# Patient Record
Sex: MALE | Race: WHITE | NOT HISPANIC OR LATINO | Employment: OTHER | URBAN - NONMETROPOLITAN AREA
[De-identification: names, ages, dates, MRNs, and addresses within clinical notes are randomized per-mention and may not be internally consistent; named-entity substitution may affect disease eponyms.]

---

## 2017-01-13 ENCOUNTER — HOSPITAL ENCOUNTER (OUTPATIENT)
Dept: ULTRASOUND IMAGING | Facility: HOSPITAL | Age: 52
Discharge: HOME | End: 2017-01-13
Admitting: INTERNAL MEDICINE

## 2017-01-13 ENCOUNTER — APPOINTMENT (OUTPATIENT)
Dept: LAB | Facility: HOSPITAL | Age: 52
End: 2017-01-13

## 2017-01-13 ENCOUNTER — OFFICE VISIT (OUTPATIENT)
Dept: GASTROENTEROLOGY | Facility: CLINIC | Age: 52
End: 2017-01-13

## 2017-01-13 VITALS
HEART RATE: 64 BPM | OXYGEN SATURATION: 98 % | SYSTOLIC BLOOD PRESSURE: 119 MMHG | WEIGHT: 136 LBS | HEIGHT: 65 IN | DIASTOLIC BLOOD PRESSURE: 72 MMHG | BODY MASS INDEX: 22.66 KG/M2

## 2017-01-13 DIAGNOSIS — K62.5 HEMORRHAGE OF ANUS AND RECTUM: ICD-10-CM

## 2017-01-13 DIAGNOSIS — B18.2 CHRONIC HEPATITIS C WITHOUT HEPATIC COMA (HCC): Primary | ICD-10-CM

## 2017-01-13 DIAGNOSIS — K59.00 CONSTIPATION, UNSPECIFIED CONSTIPATION TYPE: ICD-10-CM

## 2017-01-13 DIAGNOSIS — R19.4 CHANGE IN BOWEL HABITS: ICD-10-CM

## 2017-01-13 DIAGNOSIS — K21.9 GASTROESOPHAGEAL REFLUX DISEASE, ESOPHAGITIS PRESENCE NOT SPECIFIED: ICD-10-CM

## 2017-01-13 DIAGNOSIS — F10.10 ALCOHOL ABUSE: ICD-10-CM

## 2017-01-13 DIAGNOSIS — R12 HEARTBURN: ICD-10-CM

## 2017-01-13 DIAGNOSIS — R10.32 LEFT LOWER QUADRANT PAIN: ICD-10-CM

## 2017-01-13 LAB
FERRITIN SERPL-MCNC: 134 NG/ML (ref 21.9–321.7)
HBV SURFACE AB SER RIA-ACNC: NORMAL
HBV SURFACE AG SERPL QL IA: NORMAL
IRON 24H UR-MRATE: 76 MCG/DL (ref 53–167)
IRON SATN MFR SERPL: 23 % (ref 20–50)
TIBC SERPL-MCNC: 329 MCG/DL (ref 241–421)

## 2017-01-13 PROCEDURE — 36415 COLL VENOUS BLD VENIPUNCTURE: CPT | Performed by: INTERNAL MEDICINE

## 2017-01-13 PROCEDURE — 87340 HEPATITIS B SURFACE AG IA: CPT | Performed by: INTERNAL MEDICINE

## 2017-01-13 PROCEDURE — 83516 IMMUNOASSAY NONANTIBODY: CPT | Performed by: INTERNAL MEDICINE

## 2017-01-13 PROCEDURE — 87522 HEPATITIS C REVRS TRNSCRPJ: CPT | Performed by: INTERNAL MEDICINE

## 2017-01-13 PROCEDURE — 82103 ALPHA-1-ANTITRYPSIN TOTAL: CPT | Performed by: INTERNAL MEDICINE

## 2017-01-13 PROCEDURE — 99244 OFF/OP CNSLTJ NEW/EST MOD 40: CPT | Performed by: INTERNAL MEDICINE

## 2017-01-13 PROCEDURE — 86038 ANTINUCLEAR ANTIBODIES: CPT | Performed by: INTERNAL MEDICINE

## 2017-01-13 PROCEDURE — 76705 ECHO EXAM OF ABDOMEN: CPT | Performed by: RADIOLOGY

## 2017-01-13 PROCEDURE — 83540 ASSAY OF IRON: CPT | Performed by: INTERNAL MEDICINE

## 2017-01-13 PROCEDURE — 82728 ASSAY OF FERRITIN: CPT | Performed by: INTERNAL MEDICINE

## 2017-01-13 PROCEDURE — 87350 HEPATITIS BE AG IA: CPT | Performed by: INTERNAL MEDICINE

## 2017-01-13 PROCEDURE — 83550 IRON BINDING TEST: CPT | Performed by: INTERNAL MEDICINE

## 2017-01-13 PROCEDURE — 87902 NFCT AGT GNTYP ALYS HEP C: CPT | Performed by: INTERNAL MEDICINE

## 2017-01-13 PROCEDURE — 76705 ECHO EXAM OF ABDOMEN: CPT

## 2017-01-13 PROCEDURE — 86708 HEPATITIS A ANTIBODY: CPT | Performed by: INTERNAL MEDICINE

## 2017-01-13 PROCEDURE — 82104 ALPHA-1-ANTITRYPSIN PHENO: CPT | Performed by: INTERNAL MEDICINE

## 2017-01-13 PROCEDURE — 86706 HEP B SURFACE ANTIBODY: CPT | Performed by: INTERNAL MEDICINE

## 2017-01-13 PROCEDURE — 82390 ASSAY OF CERULOPLASMIN: CPT | Performed by: INTERNAL MEDICINE

## 2017-01-13 RX ORDER — POLYETHYLENE GLYCOL 3350 17 G/17G
POWDER, FOR SOLUTION ORAL
Qty: 510 G | Refills: 0 | Status: SHIPPED | OUTPATIENT
Start: 2017-01-13

## 2017-01-13 RX ORDER — ONDANSETRON 4 MG/1
TABLET, FILM COATED ORAL
Qty: 5 TABLET | Refills: 0 | Status: SHIPPED | OUTPATIENT
Start: 2017-01-13

## 2017-01-13 NOTE — MR AVS SNAPSHOT
Robbie Bauman   1/13/2017 9:40 AM   Office Visit    Dept Phone:  [phone]   Encounter #:  [id number]    Provider:  Sonya Caballero DO   Department:  [facility] GASTROENTEROLOGY                Your Full Care Plan              Today's Medication Changes          These changes are accurate as of: 1/13/17 10:49 AM.  If you have any questions, ask your nurse or doctor.               New Medication(s)Ordered:     bisacodyl 5 MG EC tablet   Commonly known as:  DULCOLAX   Take 4 tablets at 8am with a full glass of water.   Started by:  Sonya Caballero DO       ondansetron 4 MG tablet   Commonly known as:  ZOFRAN   Take 1 tablet PRN nausea every 4 hours.   Started by:  Sonya Caballero DO       polyethylene glycol packet   Commonly known as:  MIRALAX   Take 255g of Miralax with 32 oz clear liquid at 8pm the night before the procedure. Repeat 255g Miralax 6 hrs prior to your procedure.   Started by:  Sonya Caballero DO            Where to Get Your Medications      These medications were sent to [address] [phone] [facility] [phone] [address] [address]     Phone:  [phone]     bisacodyl 5 MG EC tablet    ondansetron 4 MG tablet    polyethylene glycol packet                  Your Updated Medication List          This list is accurate as of: 1/13/17 10:49 AM.  Always use your most recent med list.                bisacodyl 5 MG EC tablet   Commonly known as:  DULCOLAX   Take 4 tablets at 8am with a full glass of water.       ondansetron 4 MG tablet   Commonly known as:  ZOFRAN   Take 1 tablet PRN nausea every 4 hours.       polyethylene glycol packet   Commonly known as:  MIRALAX   Take 255g of Miralax with 32 oz clear liquid at 8pm the night before the procedure. Repeat 255g Miralax 6 hrs prior to your procedure.               We Performed the Following     Alpha - 1 - Antitrypsin Phenotype     Anti-Smooth Muscle Antibody Titer     Celiac Comprehensive Panel     Ceruloplasmin     Ferritin     HCV RNA By PCR, Qn Rfx Casie     Hepatitis A Antibody, Total     Hepatitis B E Antigen     Hepatitis B Surface Antibody     Hepatitis B Surface Antigen     Iron Profile     Mitochondrial Antibodies, M2     Nuclear Antigen Antibody, IFA     US Liver       You Were Diagnosed With        Codes Comments    Acute hepatitis C virus infection without hepatic coma    -  Primary ICD-10-CM: B17.10  ICD-9-CM: 070.51     Hemorrhage of anus and rectum     ICD-10-CM: K62.5  ICD-9-CM: 569.3     Constipation, unspecified constipation type     ICD-10-CM: K59.00  ICD-9-CM: 564.00     Change in bowel habits     ICD-10-CM: R19.4  ICD-9-CM: 787.99     Left lower quadrant pain     ICD-10-CM: R10.32  ICD-9-CM: 789.04     Heartburn     ICD-10-CM: R12  ICD-9-CM: 787.1     Gastroesophageal reflux disease, esophagitis presence not specified     ICD-10-CM: K21.9  ICD-9-CM: 530.81     Alcohol abuse     ICD-10-CM: F10.10  ICD-9-CM: 305.00       Instructions           PILO CABRAL D.O.  Board Certified in Gastroenterology    Miralax Colonoscopy One Day Prep    Do these things 7 DAYS BEFORE the procedure:  • Arrange a ride: You will be given medicine that makes you relax and be sleepy, so you cannot drive a car or take a bus home. If you arrive without an escort, your procedure may need to be rescheduled.   • Stop taking Iron and Vitamin E  • If you are taking Coumadin, Plavix, and/or Warfarin or if you are a diabetic, call your doctor for special instructions.  • Do not eat any seeds, popcorn or nuts.  • Please inform the physician of any history of heart murmurs, valve replacement, heart or lung conditions, or if you have had any adverse reactions to anesthesia.    Do these things 3 DAYS BEFORE the procedure:  • Confirm your ride.  • If you need to cancel your appointment, call your doctor.   • Review the diet you need to follow for the next  two days. Plan your meals according to the clear liquid diet.    Do these things 1 DAY BEFORE the procedure:  • Beginning at breakfast and lasting until midnight, start a clear liquid diet, a clear liquid diet can be found below.  • It is very important that you increase your fluids throughout the day.  • At approximately 8:00 AM take 4 Dulcolax (bisacodyl) tablets with 8 ounces of water.   • 8:00 PM - Mix 32 ounces of a pre-warmed liquid of your choice, such as lemon flavored Crystal Light, apple juice, or Gatorade (except red, blue, green, or purple) with 15 capfuls of MiraLax. Mix well until the MiraLax has dissolved. DO NOT REFRIGERATE (refrigeration will cause the MiraLax to not dissolve completely.) You may pour each glass over ice after the MiraLax has been dissolved if you prefer to drink it cold. Drink entire mixture in 1 hour.     Do these things ON THE DAY OF the procedure:   • 6 hours prior to arrival mix 15 capfuls with 32 ounces of liquid and drink entire mixture in 1 hour.  • You may take any essential medications with a small amount of water, otherwise do not eat or drink anything on the morning of your procedure. Please contact your Primary Care Physician for the insulin dose, if applicable.  • Once again, it is very important for you to bring someone with you at the time of your procedure to drive you home.  • Nothing to eat or drink 4 hours prior to procedure.    **REMINDERS**  **Please note: If you experience nausea, you may take Phenergan and/or Zofran. 1 tablet every 4-6 hours as needed.   ** Before presenting for your EGD or Colonoscopy you will need to have a shower/bath.         Clear Liquid Diet  This diet provides fluids that leave little residue and are easily absorbed with minimal digestive activity. This diet is inadequate in all essential nutrients and is recommended only if clear liquids are temporarily needed. No red, purple, blue or green liquids should be consumed.    FOOD GROUP  FOODS ALLOWED FOODS TO AVOID   Milk & Beverages  No red or purple liquids! Tea, coffee, carbonated or fruit flavored drinks Milk, milk drinks   Meat & Meat substances None All   Vegetables None All   Fruits & Fruit Juices Strained fruit juices: apple, white grape, lemonade Fruit juices with fruit pulp   Grains & Starches None All   Soups Clear broth, consommé, Beef, chicken, vegetable broth All others   Desserts Clear flavored gelatin or popsicles. No red, purple, blue and green.  All others   Fats None All   Miscellaneous Sugar, honey, syrup, clear hard candy, salt All others       Breakfast Lunch Dinner   4 oz White grape juice 4 oz Apple juice 4 oz Lemonade   6 oz clear broth 6 oz Clear broth 6 oz Clear Broth   Jell-O* Jell-O* Jell-O*   Tea or Coffee Tea or Coffee Tea or Coffee   *Plain only, no fruit or toppings.    Procedure is at [facility] Date:    2/13      Arrival Time:    9am        Patients states to understand and to be compliant with all instructions discussed.       Thanks,  NEFTALY Schwarz     Patient Instructions History      Upcoming Appointments     Visit Type Date Time Department    NEW PATIENT 1/13/2017  9:40 AM [facility] [place]    FOLLOW UP 2/24/2017 10:20 AM [facility] Signup     Our records indicate that your [facility] [employer] account has been deactivated. If you would like to reactivate your account, please email [email or web address] or call [phone] to talk to our [employer] staff.             Other Info from Your Visit           Your Appointments     Feb 24, 2017 10:20 AM EST   Follow Up with Valery Mendosa PA-C   [facility] (--)    [address] [place] [id number] [phone]           Arrive 15 minutes prior to appointment.              Allergies     No Known Allergies      Reason for Visit     Hepatitis C           Vital Signs     Blood Pressure Pulse Height Weight Oxygen Saturation Body Mass  "Index    119/72 64 65\" (165.1 cm) 136 lb (61.7 kg) 98% 22.63 kg/m2    Smoking Status                   Heavy Tobacco Smoker           Problems and Diagnoses Noted     Hepatitis C    -  Primary    Hemorrhage of anus and rectum        Constipation        Change in bowel habits        Abdominal pain, left lower quadrant        Heartburn        Acid reflux disease        Alcohol abuse            "

## 2017-01-13 NOTE — LETTER
"2017     Kyle Cooper PA-C  [address] [facility] [place] [address]    Patient: Robbie Bauman   YOB: 1965   Date of Visit: 2017       Dear Dr. Kenneth PA-C:    Robbie Bauman was in my office today. Below is a copy of my note.    If you have questions, please do not hesitate to call me. I look forward to following Robbie along with you.         Sincerely,        Sonya Caballero,         CC: No Recipients    : 1965    Chief Complaint   Patient presents with   • Hepatitis C       Robbie Bauman is a 51 y.o. male who presents to the office today as a consultation from Kyle Cooper PA-C for evaluation of Hepatitis C    History of Present Illness:    He presents for evaluation Hepatitis C. His risk factors include tattoos, and intranasal illicit drugs (cocaine).  He denies any history of blood transfusions, and intravenous drugs.   He about drank him self to death, and lost his family. One night, he prayer for help to stop drinking and stopped the next day.  He drank 8 to 12 beers dally for the thirty years. He has been sober for the last two years.    He cannot take ASA or ibuprofen due to left lower quadrant abdominal pain.  It is like a hot coal in his left side.  He is able to tolerate Goody's powders and BC powders without the left-sided pain.  He has significant heartburn every day.  He is taking Zantac 150 mg. with some relief.  He feels like fire is coming out of his mouth when he belches.    He has intermittent episodes of blood in his stools that is dark red.  He does have a history of hemorrhoids.  The hemorrhoids are not bothering him at this time. This week, he has had normal, daily bowel movement. But many times he will not have a bowel movement every three to four days. He will have an occasional bout of diarrhea. He feels like his back pain is similar to \"bad constipation.\"     He reports a colonoscopy in  with no significant findings.     He has " terrible back pain and wants to get resolution to the problem.  He plans to talk with the insurance company, to determine his next step for treatment of his back.  He is reluctant to go on pain medication.      Review of Systems   Constitutional: Positive for appetite change, fatigue and unexpected weight change. Negative for chills and fever.   HENT: Positive for hearing loss and nosebleeds. Negative for mouth sores.    Eyes: Positive for itching and visual disturbance.   Respiratory: Negative for cough, chest tightness, shortness of breath and wheezing.    Cardiovascular: Positive for chest pain. Negative for palpitations and leg swelling.   Endocrine: Positive for cold intolerance. Negative for heat intolerance, polydipsia and polyuria.   Genitourinary: Negative for dysuria, frequency and hematuria.   Musculoskeletal: Positive for arthralgias, joint swelling and myalgias.   Skin: Negative for rash and wound.   Allergic/Immunologic: Negative for food allergies and immunocompromised state.   Neurological: Negative for seizures, syncope, weakness and light-headedness.   Hematological: Negative for adenopathy. Does not bruise/bleed easily.   Psychiatric/Behavioral: Negative for confusion and sleep disturbance. The patient is not nervous/anxious.    Gastrointestinal: Positive for abdominal pain, blood in stool, change in bowel habits, constipation, diarrhea, heartburn, hemorrhoids and poor appetite.    Past Medical History   Diagnosis Date   • Anxiety and depression    • Arthritis    • GERD (gastroesophageal reflux disease)    • IBS (irritable bowel syndrome)    • Sleep apnea        Past Surgical History   Procedure Laterality Date   • No past surgeries     • Colonoscopy  2015     [facility]       Family History   Problem Relation Age of Onset   • Diabetes Mother    • No Known Problems Father    • Liver disease Maternal Uncle    • Diabetes Maternal Grandmother    • Heart disease Maternal Grandmother    •  "Diabetes Maternal Grandfather    • Heart disease Maternal Grandfather    • No Known Problems Paternal Grandmother    • No Known Problems Paternal Grandfather    • Colon cancer Neg Hx    • Colon polyps Neg Hx        History   Smoking Status   • Heavy Tobacco Smoker   • Packs/day: 1.00   • Types: Cigarettes   Smokeless Tobacco   • Not on file     History   Alcohol Use No     History   Drug Use No     Marital Status:       Current Outpatient Prescriptions:   None    Allergies:   Review of patient's allergies indicates no known allergies.    Visit Vitals   • /72   • Pulse 64   • Ht 65\" (165.1 cm)   • Wt 136 lb (61.7 kg)   • SpO2 98%   • BMI 22.63 kg/m2       Physical Exam   Constitutional: He is oriented to person, place, and time. He appears well-developed and well-nourished. No distress.   HENT:   Head: Normocephalic and atraumatic.   Right Ear: External ear normal.   Left Ear: External ear normal.   Nose: Nose normal.   Mouth/Throat: Oropharynx is clear and moist.   Eyes: Conjunctivae and EOM are normal. Right eye exhibits no discharge. Left eye exhibits no discharge. No scleral icterus.   Neck: Normal range of motion. Neck supple.   Cardiovascular: Normal rate, regular rhythm and normal heart sounds.  Exam reveals no gallop and no friction rub.    No murmur heard.  Pulmonary/Chest: Effort normal and breath sounds normal. No respiratory distress. He has no wheezes. He has no rales. He exhibits no tenderness.   Abdominal: Soft. Normal appearance and bowel sounds are normal. He exhibits no distension, no ascites and no mass. There is generalized tenderness. There is no rigidity and no guarding. No hernia.   Musculoskeletal: Normal range of motion. He exhibits no edema or deformity.   Neurological: He is alert and oriented to person, place, and time. He exhibits normal muscle tone. Coordination normal.   Skin: Skin is warm and dry. No rash noted. No erythema. No pallor.   Psychiatric: He has a normal mood " and affect. His behavior is normal. Judgment and thought content normal.   Nursing note and vitals reviewed.      Assessment:  1. Chronic hepatitis C without hepatic coma    2. Change in bowel habits    3. Constipation, unspecified constipation type    4. Hemorrhage of anus and rectum    5. Left lower quadrant pain    6. Heartburn    7. Gastroesophageal reflux disease, esophagitis presence not specified    8. Alcohol abuse        Plan:  Robbie Bauman has abnormal AST and ALT. He will be tested for the common liver diagnoses including; Alpha 1 antitrypsin deficiency, autoimmune hepatitis, primary biliary cirrhosis, Luisito's disease, hereditary hemochromatosis, celiac diease, hepatitis A immunity, hepatitis B, and hepatitis C with reflex genotype serum test. He is known to have Hepatitis C.  He will have an ultrasound of the liver to assess the extent of liver disease. Cirrhosis from any cause is the primary risk factor for HCC: approximately 80% of cases of HCC occur in individuals with cirrhosis and the risk of developing HCC increases with fibrosis stage. Chronic HCV and chronic hepatitis B virus (HBV) infection are the most common risk factors for HCC.  He was asked to abstain from alcohol use.  He understands that the use of alcohol with viral hepatitis is synergistic and will accelerate the disease process.  ·  He will need an esophagogastroduodenoscopy and colonoscopy performed with IV general sedation. All of the risks, benefits and alternatives of these procedures have been discussed with him, all of his questions have been answered and he has elected to proceed. He should follow up in the office after these procedures to discuss the results and further recommendations can be made at that time.  · He was instructed not to lie down immediately after eating (wait at least 3 hours after meals), elevate the head of the bed at night, avoid spicy foods, avoid mints, avoid caffeine, avoid nicotine and maintain a  healthy weight. He will start taking omeprazole 40mg once daily 30 minutes before meals.   · He will continue current medications. He will take a PPI with the NSAID use to prevent complications of GERD, and ulcers.  · I discussed the patient's findings and my recommendations with the patient. All of their questions were answered to their satisfaction and they understand the plan.   · He will call with any interval concerns.       Return for next scheduled follow up after procedure.          Electronically signed by: Sonya Caballero D.O. 1/13/2017 at 10:28 AM  .

## 2017-01-13 NOTE — PROGRESS NOTES
": 1965    Chief Complaint   Patient presents with   • Hepatitis C       Robbie Bauman is a 51 y.o. male who presents to the office today as a consultation from Kyle Cooper PA-C for evaluation of Hepatitis C    History of Present Illness:    He presents for evaluation Hepatitis C. His risk factors include tattoos, and intranasal illicit drugs (cocaine).  He denies any history of blood transfusions, and intravenous drugs.   He about drank him self to death, and lost his family. One night, he prayer for help to stop drinking and stopped the next day.  He drank 8 to 12 beers dally for the thirty years. He has been sober for the last two years.    He cannot take ASA or ibuprofen due to left lower quadrant abdominal pain.  It is like a hot coal in his left side.  He is able to tolerate Goody's powders and BC powders without the left-sided pain.  He has significant heartburn every day.  He is taking Zantac 150 mg. with some relief.  He feels like fire is coming out of his mouth when he belches.    He has intermittent episodes of blood in his stools that is dark red.  He does have a history of hemorrhoids.  The hemorrhoids are not bothering him at this time. This week, he has had normal, daily bowel movement. But many times he will not have a bowel movement every three to four days. He will have an occasional bout of diarrhea. He feels like his back pain is similar to \"bad constipation.\"     He reports a colonoscopy in  with no significant findings.     He has terrible back pain and wants to get resolution to the problem.  He plans to talk with the insurance company, to determine his next step for treatment of his back.  He is reluctant to go on pain medication.      Review of Systems   Constitutional: Positive for appetite change, fatigue and unexpected weight change. Negative for chills and fever.   HENT: Positive for hearing loss and nosebleeds. Negative for mouth sores.    Eyes: Positive for itching and " visual disturbance.   Respiratory: Negative for cough, chest tightness, shortness of breath and wheezing.    Cardiovascular: Positive for chest pain. Negative for palpitations and leg swelling.   Endocrine: Positive for cold intolerance. Negative for heat intolerance, polydipsia and polyuria.   Genitourinary: Negative for dysuria, frequency and hematuria.   Musculoskeletal: Positive for arthralgias, joint swelling and myalgias.   Skin: Negative for rash and wound.   Allergic/Immunologic: Negative for food allergies and immunocompromised state.   Neurological: Negative for seizures, syncope, weakness and light-headedness.   Hematological: Negative for adenopathy. Does not bruise/bleed easily.   Psychiatric/Behavioral: Negative for confusion and sleep disturbance. The patient is not nervous/anxious.    Gastrointestinal: Positive for abdominal pain, blood in stool, change in bowel habits, constipation, diarrhea, heartburn, hemorrhoids and poor appetite.    Past Medical History   Diagnosis Date   • Anxiety and depression    • Arthritis    • GERD (gastroesophageal reflux disease)    • IBS (irritable bowel syndrome)    • Sleep apnea        Past Surgical History   Procedure Laterality Date   • No past surgeries     • Colonoscopy  2015     [facility]-normal       Family History   Problem Relation Age of Onset   • Diabetes Mother    • No Known Problems Father    • Liver disease Maternal Uncle    • Diabetes Maternal Grandmother    • Heart disease Maternal Grandmother    • Diabetes Maternal Grandfather    • Heart disease Maternal Grandfather    • No Known Problems Paternal Grandmother    • No Known Problems Paternal Grandfather    • Colon cancer Neg Hx    • Colon polyps Neg Hx        History   Smoking Status   • Heavy Tobacco Smoker   • Packs/day: 1.00   • Types: Cigarettes   Smokeless Tobacco   • Not on file     History   Alcohol Use No     History   Drug Use No     Marital Status:       Current Outpatient  "Prescriptions:   None    Allergies:   Review of patient's allergies indicates no known allergies.    Visit Vitals   • /72   • Pulse 64   • Ht 65\" (165.1 cm)   • Wt 136 lb (61.7 kg)   • SpO2 98%   • BMI 22.63 kg/m2       Physical Exam   Constitutional: He is oriented to person, place, and time. He appears well-developed and well-nourished. No distress.   HENT:   Head: Normocephalic and atraumatic.   Right Ear: External ear normal.   Left Ear: External ear normal.   Nose: Nose normal.   Mouth/Throat: Oropharynx is clear and moist.   Eyes: Conjunctivae and EOM are normal. Right eye exhibits no discharge. Left eye exhibits no discharge. No scleral icterus.   Neck: Normal range of motion. Neck supple.   Cardiovascular: Normal rate, regular rhythm and normal heart sounds.  Exam reveals no gallop and no friction rub.    No murmur heard.  Pulmonary/Chest: Effort normal and breath sounds normal. No respiratory distress. He has no wheezes. He has no rales. He exhibits no tenderness.   Abdominal: Soft. Normal appearance and bowel sounds are normal. He exhibits no distension, no ascites and no mass. There is generalized tenderness. There is no rigidity and no guarding. No hernia.   Musculoskeletal: Normal range of motion. He exhibits no edema or deformity.   Neurological: He is alert and oriented to person, place, and time. He exhibits normal muscle tone. Coordination normal.   Skin: Skin is warm and dry. No rash noted. No erythema. No pallor.   Psychiatric: He has a normal mood and affect. His behavior is normal. Judgment and thought content normal.   Nursing note and vitals reviewed.      Assessment:  1. Chronic hepatitis C without hepatic coma    2. Change in bowel habits    3. Constipation, unspecified constipation type    4. Hemorrhage of anus and rectum    5. Left lower quadrant pain    6. Heartburn    7. Gastroesophageal reflux disease, esophagitis presence not specified    8. Alcohol abuse        Plan:  Robbie" Mitul has abnormal AST and ALT. He will be tested for the common liver diagnoses including; Alpha 1 antitrypsin deficiency, autoimmune hepatitis, primary biliary cirrhosis, Luisito's disease, hereditary hemochromatosis, celiac diease, hepatitis A immunity, hepatitis B, and hepatitis C with reflex genotype serum test. He is known to have Hepatitis C. laboratory values from 12/15/2016 revealed hepatitis C antibody reactive with quantitative real-time PCR 3,100,109 international units per milliliter.  He will have an ultrasound of the liver to assess the extent of liver disease. Cirrhosis from any cause is the primary risk factor for HCC: approximately 80% of cases of HCC occur in individuals with cirrhosis and the risk of developing HCC increases with fibrosis stage. Chronic HCV and chronic hepatitis B virus (HBV) infection are the most common risk factors for HCC.  He was asked to abstain from alcohol use.  He understands that the use of alcohol with viral hepatitis is synergistic and will accelerate the disease process.  He will need an esophagogastroduodenoscopy and colonoscopy performed with IV general sedation. All of the risks, benefits and alternatives of these procedures have been discussed with him, all of his questions have been answered and he has elected to proceed. He should follow up in the office after these procedures to discuss the results and further recommendations can be made at that time.  · He was instructed not to lie down immediately after eating (wait at least 3 hours after meals), elevate the head of the bed at night, avoid spicy foods, avoid mints, avoid caffeine, avoid nicotine and maintain a healthy weight. He will start taking omeprazole 40mg once daily 30 minutes before meals.   · He will continue current medications. He will take a PPI with the NSAID use to prevent complications of GERD, and ulcers.  · I discussed the patient's findings and my recommendations with the patient. All of  their questions were answered to their satisfaction and they understand the plan.   · He will call with any interval concerns.       Return for next scheduled follow up after procedure.          Electronically signed by: Sonya Caballero D.O. 1/13/2017 at 10:28 AM  .

## 2017-01-13 NOTE — PATIENT INSTRUCTIONS
PILO CABRAL D.O.  Board Certified in Gastroenterology    Miralax Colonoscopy One Day Prep    Do these things 7 DAYS BEFORE the procedure:  • Arrange a ride: You will be given medicine that makes you relax and be sleepy, so you cannot drive a car or take a bus home. If you arrive without an escort, your procedure may need to be rescheduled.   • Stop taking Iron and Vitamin E  • If you are taking Coumadin, Plavix, and/or Warfarin or if you are a diabetic, call your doctor for special instructions.  • Do not eat any seeds, popcorn or nuts.  • Please inform the physician of any history of heart murmurs, valve replacement, heart or lung conditions, or if you have had any adverse reactions to anesthesia.    Do these things 3 DAYS BEFORE the procedure:  • Confirm your ride.  • If you need to cancel your appointment, call your doctor.   • Review the diet you need to follow for the next two days. Plan your meals according to the clear liquid diet.    Do these things 1 DAY BEFORE the procedure:  • Beginning at breakfast and lasting until midnight, start a clear liquid diet, a clear liquid diet can be found below.  • It is very important that you increase your fluids throughout the day.  • At approximately 8:00 AM take 4 Dulcolax (bisacodyl) tablets with 8 ounces of water.   • 8:00 PM - Mix 32 ounces of a pre-warmed liquid of your choice, such as lemon flavored Crystal Light, apple juice, or Gatorade (except red, blue, green, or purple) with 15 capfuls of MiraLax. Mix well until the MiraLax has dissolved. DO NOT REFRIGERATE (refrigeration will cause the MiraLax to not dissolve completely.) You may pour each glass over ice after the MiraLax has been dissolved if you prefer to drink it cold. Drink entire mixture in 1 hour.     Do these things ON THE DAY OF the procedure:   • 6 hours prior to arrival mix 15 capfuls with 32 ounces of liquid and drink entire mixture in 1 hour.  • You may take any essential medications  with a small amount of water, otherwise do not eat or drink anything on the morning of your procedure. Please contact your Primary Care Physician for the insulin dose, if applicable.  • Once again, it is very important for you to bring someone with you at the time of your procedure to drive you home.  • Nothing to eat or drink 4 hours prior to procedure.    **REMINDERS**  **Please note: If you experience nausea, you may take Phenergan and/or Zofran. 1 tablet every 4-6 hours as needed.   ** Before presenting for your EGD or Colonoscopy you will need to have a shower/bath.         Clear Liquid Diet  This diet provides fluids that leave little residue and are easily absorbed with minimal digestive activity. This diet is inadequate in all essential nutrients and is recommended only if clear liquids are temporarily needed. No red, purple, blue or green liquids should be consumed.    FOOD GROUP FOODS ALLOWED FOODS TO AVOID   Milk & Beverages  No red or purple liquids! Tea, coffee, carbonated or fruit flavored drinks Milk, milk drinks   Meat & Meat substances None All   Vegetables None All   Fruits & Fruit Juices Strained fruit juices: apple, white grape, lemonade Fruit juices with fruit pulp   Grains & Starches None All   Soups Clear broth, consommé, Beef, chicken, vegetable broth All others   Desserts Clear flavored gelatin or popsicles. No red, purple, blue and green.  All others   Fats None All   Miscellaneous Sugar, honey, syrup, clear hard candy, salt All others       Breakfast Lunch Dinner   4 oz White grape juice 4 oz Apple juice 4 oz Lemonade   6 oz clear broth 6 oz Clear broth 6 oz Clear Broth   Jell-O* Jell-O* Jell-O*   Tea or Coffee Tea or Coffee Tea or Coffee   *Plain only, no fruit or toppings.    Procedure is at [facility] Date:    2/13      Arrival Time:    9am        Patients states to understand and to be compliant with all instructions discussed.       Thanks,  NEFTALY Schwarz

## 2017-01-14 RX ORDER — OMEPRAZOLE 40 MG/1
CAPSULE, DELAYED RELEASE ORAL
Qty: 30 CAPSULE | Refills: 5 | Status: SHIPPED | OUTPATIENT
Start: 2017-01-14

## 2017-01-15 LAB
CERULOPLASMIN SERPL-MCNC: 25.1 MG/DL (ref 16–31)
HAV AB SER QL IA: POSITIVE
HBV E AG SERPL QL IA: NEGATIVE

## 2017-01-16 LAB
ACTIN IGG SERPL-ACNC: 6 UNITS (ref 0–19)
DEPRECATED MITOCHONDRIA M2 IGG SER-ACNC: <20 UNITS (ref 0–20)
ENDOMYSIUM IGA SER QL: NEGATIVE
GLIADIN PEPTIDE IGA SER-ACNC: 4 UNITS (ref 0–19)
GLIADIN PEPTIDE IGG SER-ACNC: 3 UNITS (ref 0–19)
IGA SERPL-MCNC: 203 MG/DL (ref 90–386)
TTG IGA SER-ACNC: <2 U/ML (ref 0–3)
TTG IGG SER-ACNC: <2 U/ML (ref 0–5)

## 2017-01-17 LAB
A1AT SERPL-MCNC: 144 MG/DL (ref 90–200)
ANA SER QL IA: NEGATIVE
PHENOTYPE: NORMAL

## 2017-01-18 LAB
HCV GENTYP SERPL NAA+PROBE: 3
HCV GENTYP SERPL NAA+PROBE: NORMAL
HCV RNA SERPL NAA+PROBE-ACNC: NORMAL IU/ML
HCV RNA SERPL NAA+PROBE-LOG IU: 6.58 LOG10 IU/ML
Lab: NORMAL
REF LAB TEST REF RANGE: NORMAL

## 2017-03-01 ENCOUNTER — TELEPHONE (OUTPATIENT)
Dept: GASTROENTEROLOGY | Facility: CLINIC | Age: 52
End: 2017-03-01

## 2021-03-23 ENCOUNTER — BULK ORDERING (OUTPATIENT)
Dept: CASE MANAGEMENT | Facility: OTHER | Age: 56
End: 2021-03-23

## 2021-03-23 DIAGNOSIS — Z23 IMMUNIZATION DUE: ICD-10-CM
